# Patient Record
Sex: FEMALE | Race: ASIAN | Employment: UNEMPLOYED | URBAN - METROPOLITAN AREA
[De-identification: names, ages, dates, MRNs, and addresses within clinical notes are randomized per-mention and may not be internally consistent; named-entity substitution may affect disease eponyms.]

---

## 2019-09-18 ENCOUNTER — ANCILLARY PROCEDURE (OUTPATIENT)
Dept: GENERAL RADIOLOGY | Facility: CLINIC | Age: 19
End: 2019-09-18
Attending: FAMILY MEDICINE
Payer: COMMERCIAL

## 2019-09-18 ENCOUNTER — OFFICE VISIT (OUTPATIENT)
Dept: FAMILY MEDICINE | Facility: CLINIC | Age: 19
End: 2019-09-18
Payer: COMMERCIAL

## 2019-09-18 VITALS
BODY MASS INDEX: 24.8 KG/M2 | WEIGHT: 140 LBS | HEIGHT: 63 IN | TEMPERATURE: 97.7 F | OXYGEN SATURATION: 98 % | SYSTOLIC BLOOD PRESSURE: 95 MMHG | HEART RATE: 79 BPM | DIASTOLIC BLOOD PRESSURE: 46 MMHG

## 2019-09-18 DIAGNOSIS — M25.572 PAIN IN JOINT INVOLVING ANKLE AND FOOT, LEFT: ICD-10-CM

## 2019-09-18 DIAGNOSIS — S92.352A CLOSED FRACTURE OF FIFTH METATARSAL BONE OF LEFT FOOT, PHYSEAL INVOLVEMENT UNSPECIFIED, INITIAL ENCOUNTER: Primary | ICD-10-CM

## 2019-09-18 PROCEDURE — 73610 X-RAY EXAM OF ANKLE: CPT | Mod: LT

## 2019-09-18 PROCEDURE — 99203 OFFICE O/P NEW LOW 30 MIN: CPT | Performed by: FAMILY MEDICINE

## 2019-09-18 PROCEDURE — 73630 X-RAY EXAM OF FOOT: CPT | Mod: LT

## 2019-09-18 RX ORDER — BREAST PUMP
EACH MISCELLANEOUS
COMMUNITY
Start: 2019-06-22

## 2019-09-18 RX ORDER — IBUPROFEN 600 MG/1
600 TABLET, FILM COATED ORAL
COMMUNITY
Start: 2019-06-22

## 2019-09-18 RX ORDER — PYRIDOXINE HCL (VITAMIN B6) 25 MG
TABLET ORAL
COMMUNITY
Start: 2018-10-26

## 2019-09-18 SDOH — HEALTH STABILITY: MENTAL HEALTH: HOW OFTEN DO YOU HAVE A DRINK CONTAINING ALCOHOL?: NEVER

## 2019-09-18 ASSESSMENT — PAIN SCALES - GENERAL: PAINLEVEL: WORST PAIN (10)

## 2019-09-18 ASSESSMENT — MIFFLIN-ST. JEOR: SCORE: 1375.29

## 2019-09-18 NOTE — PROGRESS NOTES
Subjective     Shivani Wolfe is a 19 year old female who presents to clinic today for the following health issues:    HPI   Joint Pain  SUBJECTIVE:  Shivani Wolfe is a 19 year old female who sustained a left foot injury 4 days ago. Mechanism of injury: fell on stair, twist foot and ankle, left. Immediate symptoms: immediate pain. Symptoms have been sudden since that time. Prior history of related problems: no prior problems with this area in the past.    OBJECTIVE:  Vital signs as noted above.  Appearance: in no apparent distress.  Foot/ankle exam: Left foot/ ankle exam :  Limited , 2nd to pain and swelling  soft tissue swelling and tenderness over the lateral malleolus, soft tissue swelling and tenderness over the base of 5th metatarsal, peripheral pulses normal.  X-ray: fracture of :  Recent Results (from the past 24 hour(s))   XR Ankle Left G/E 3 Views    Narrative    LEFT ANKLE THREE OR MORE VIEWS   9/18/2019 2:47 PM     HISTORY: Pain in joint involving ankle and foot, left.    Comparison: None.    FINDINGS: Lateral soft tissue swelling. No fracture about the ankle is  evident and there is no talar dome osteochondral lesion. However,  there is a mildly displaced oblique fracture in the mid to distal  shaft of the fifth metatarsal.      Impression    IMPRESSION: Soft tissue swelling and fifth metatarsal fracture.    URI JACKSON MD   XR Foot Left G/E 3 Views    Narrative    FOOT LEFT THREE OR MORE VIEWS   9/18/2019 2:48 PM     HISTORY:  Pain in joint involving ankle and foot, left    COMPARISON: None.    FINDINGS: Lateral soft tissue swelling. Mildly displaced oblique  fracture in the distal shaft of the fifth metatarsal. No angulation.  No articular surface involvement. Otherwise negative.      Impression    IMPRESSION: Mildly displaced oblique fracture in the distal shaft of  the fifth metatarsal.    URI JACKSON MD   .    ASSESSMENT:    ICD-10-CM    1. Closed fracture of fifth metatarsal bone of left foot,  physeal involvement unspecified, initial encounter S92.352A ORTHOPEDICS ADULT REFERRAL     order for DME   2. Pain in joint involving ankle and foot, left M25.572 XR Ankle Left G/E 3 Views     XR Foot Left G/E 3 Views     ORTHOPEDICS ADULT REFERRAL       PLAN:  NSAID, ice suggested  activity modification  Was fitted with a cam walker.  She does have crutches and she has been using.    She was advised with nonweightbearing.  She was referred to orthopedic  See orders in [facility].    Onset: Sunday    Description:   Location: left ankle and foot  Character: Sharp, Stabbing and Burning    Intensity: severe, 10/10    Progression of Symptoms: better, intermitten    Accompanying Signs & Symptoms:  Other symptoms: numbness and swelling    History:   Previous similar pain: YES- same ankle      Precipitating factors:   Trauma or overuse: YES- fall from stairs    Alleviating factors:  Improved by: rest/inactivity, ice and Ibuprofen    Therapies Tried and outcome: Naproxen, helped with the pain    Ольга Ramirez MD.

## 2019-09-19 ENCOUNTER — TELEPHONE (OUTPATIENT)
Dept: FAMILY MEDICINE | Facility: CLINIC | Age: 19
End: 2019-09-19

## 2019-09-19 NOTE — TELEPHONE ENCOUNTER
----- Message from Ольга Ramirez MD sent at 9/18/2019  4:01 PM CDT -----  Please inform pt. Of no other fracture, except for the left fifth metatarsal  Continue with Camwalker, elevation , ICE, no weight bearing  Follow up with Orthro  thanks

## 2019-09-19 NOTE — TELEPHONE ENCOUNTER
Attempt # 1  Called [phone] (home)     Did patient answer the phone: No, left a message on voicemail to return call to triage line at [phone].    Claudia BrooksRN,BSN  [facility]

## 2019-09-19 NOTE — TELEPHONE ENCOUNTER
Patient returned call, I advised her of Dr. Ramirez's advice, she has number for ortho and states she will call to schedule.  Lisy Nascimento RN  [facility]

## 2019-09-26 ENCOUNTER — OFFICE VISIT (OUTPATIENT)
Dept: PODIATRY | Facility: CLINIC | Age: 19
End: 2019-09-26
Payer: COMMERCIAL

## 2019-09-26 VITALS — SYSTOLIC BLOOD PRESSURE: 122 MMHG | HEART RATE: 65 BPM | DIASTOLIC BLOOD PRESSURE: 64 MMHG

## 2019-09-26 DIAGNOSIS — S93.402A SPRAIN OF LEFT ANKLE, UNSPECIFIED LIGAMENT, INITIAL ENCOUNTER: ICD-10-CM

## 2019-09-26 DIAGNOSIS — S92.355A CLOSED NONDISPLACED FRACTURE OF FIFTH METATARSAL BONE OF LEFT FOOT, INITIAL ENCOUNTER: Primary | ICD-10-CM

## 2019-09-26 PROBLEM — Z34.00 SUPERVISION OF NORMAL FIRST PREGNANCY, ANTEPARTUM: Status: ACTIVE | Noted: 2018-12-14

## 2019-09-26 PROBLEM — O36.5930 SMALL FOR GESTATIONAL AGE FETUS AFFECTING MANAGEMENT OF MOTHER, THIRD TRIMESTER, NOT APPLICABLE OR UNSPECIFIED FETUS: Status: ACTIVE | Noted: 2019-06-21

## 2019-09-26 PROCEDURE — 28470 CLTX METATARSAL FX WO MNP EA: CPT | Performed by: PODIATRIST

## 2019-09-26 PROCEDURE — 99203 OFFICE O/P NEW LOW 30 MIN: CPT | Mod: 57 | Performed by: PODIATRIST

## 2019-09-26 NOTE — PATIENT INSTRUCTIONS
Weight management plan: Patient was referred to their PCP to discuss a diet and exercise plan.  We wish you continued good healing. If you have any questions or concerns, please do not hesitate to contact us at [phone]    Please remember to call and schedule a follow up appointment if one was recommended at your earliest convenience.   PODIATRY CLINIC HOURS  TELEPHONE NUMBER    Dr. Myke Burden D.P.M [facility]    Clinics:  [facility]    [facility]   Tuesday 1PM-6PM  [place]  Wednesday 7AM-2PM  [facility]  Thursday 10AM-6PM  [place]  Friday 7AM-3PM  [place]  Specialty schedulers:   [phone] to make an appointment with any Specialty Provider.        Urgent Care locations:    [facility] Monday-Friday 5 pm - 9 pm. Saturday-Sunday 9 am -5pm    Monday-Friday 11 am - 9 pm Saturday 9 am - 5 pm     Monday-Sunday 12 noon-8PM [phone]     If you need a medication refill, please contact us you may need lab work and/or a follow up visit prior to your refill (i.e. Antifungal medications).    [employer] (secure e-mail communication and access to your chart) to send a message or to make an appointment.    If MRI needed please call [place] [facility] at [phone]

## 2019-09-26 NOTE — PROGRESS NOTES
Subjective:    Pt is seen today in consult from Dr. Ramirez for 5th met fx left foot.  Was caused by fall on sister with an inversion sprain on 9/14/2019.  She had immediate pain swelling and ecchymosis.  It was hard for her to bear weight on this.  She presented the clinic on 9/18/2019.  She was diagnosed with 1/5 metatarsal fracture.  She was given an ankle high Aircast.  She says this is comfortable.  She has crutches.  She also complains of pain in her lateral ankle.  She points mostly to the area of the calcaneal cuboid joint.  She has been wearing some compression on her foot at night but not during the day.  She has been trying to elevate this.  She denies any blistering or drainage anywhere.  She denies any calf pain or shortness of breath at all.      ROS:  A 10-point review of systems was performed and is positive for that noted in the HPI and as seen above.  All other areas are negative.          Allergies   Allergen Reactions     Wasp Venom Protein Hives and Rash     Delayed reaction to wasp  Rash over entire body         Current Outpatient Medications   Medication Sig Dispense Refill     ibuprofen (ADVIL/MOTRIN) 600 MG tablet Take 600 mg by mouth       Misc. Devices (PUMP IN STYLE ADVANCED) MISC Electric breast pump for home use. Gestation age at delivery: 39.0 weeks. Reason for need: lactating mother. Length of need: 12 months       order for DME Equipment being ordered: Knee walker    SERINA 6 weeks 1 Device 0     order for DME Equipment being ordered: short Camp walker  Left 5th metatarsal fracture. 1 Units 0     Prenatal Multivit-Min-Fe-FA (PRENATAL VITAMINS) 0.8 MG TABS Take 1 tablet by mouth       PseudoePHEDrine-naproxen Na 120-220 MG TB12        vitamin B6 (PYRIDOXINE) 25 MG tablet Take 1 tablet by mouth every other day.         Patient Active Problem List   Diagnosis     Normal delivery     Small for gestational age fetus affecting management of mother, third trimester, not applicable or  unspecified fetus     Supervision of normal first pregnancy, antepartum       No past medical history on file.    No past surgical history on file.    No family history on file.    Social History     Tobacco Use     Smoking status: Never Smoker     Smokeless tobacco: Never Used   Substance Use Topics     Alcohol use: Never     Frequency: Never         Exam:    Vitals: /64 (BP Location: Right arm)   Pulse 65   BMI: There is no height or weight on file to calculate BMI.  Height: Data Unavailable    Constitutional/ general:  Pt is in no apparent distress, appears well-nourished.  Cooperative with history and physical exam.  Patient using crutches    Psych:  The patient answered questions appropriately.  Normal affect.  Seems to have reasonable expectations, in terms of treatment.     Eyes:  Visual scanning/ tracking without deficit.     Ears:  Response to auditory stimuli is normal.  negative hearing aid devices.  Auricles in proper alignment.     Lymphatic:  Popliteal lymph nodes not enlarged.     Lungs:  Non labored breathing, non labored speech. No cough.  No audible wheezing. Even, quiet breathing.       Vascular:  positive pedal pulses bilaterally for both the DP and PT arteries.  CFT < 3 sec.  negative ankle edema.  positive pedal hair growth.    Neuro:  Alert and oriented x 3. Coordinated gait.  Light touch sensation is intact to the L4, L5, S1 distributions. No obvious deficits.  No evidence of neurological-based weakness, spasticity, or contracture in the lower extremities.     Derm: Normal texture and turgor.  No erythema, ecchymosis, or cyanosis.      Musculoskeletal:    Lower extremity muscle strength is normal.  All muscle compartments left foot appear to be intact.  Patient is using crutches with ankle high Aircast on her left foot.  No gross deformities.  Normal arch.  Pain upon palpation  of left 5th metatarsal neck.  Edema and echymosis noted left lateral foot and ankle.  No erythema.  No sign  of ulceration, infection, or drainage.  No pain over the fibula.  There is some lateral ankle pain.  The pain most noticeable lateral is at her calcaneal cuboid joint.  Minimal pain at the tarsometatarsal joint anywhere.  No medial ankle pain.  No Achilles pain.  No styloid process pain.    Radiographic Exam:  X-Ray Findings:  I personally reviewed the films.  Oblique nondisplaced fracture at the neck of the left fifth metatarsal.  There is also an avulsion fracture at the calcaneal cuboid joint only seen on the AP x-ray    Assessment:  5th met fracture left foot                         Left lateral ankle sprain with calcaneal cuboid joint avulsion fracture    Plan:  X-rays personally reviewed.  Explained to patient that she has fracture of her fifth metatarsal neck that is nondisplaced.  In addition to this she also has an avulsion fracture cuboid joint.  We pointed this out in the x-ray.  We discussed the process of bone healing.  Discussed etiology and treatment options with the patient in detail.  Explained the importance of nonweightbearing at all times.  Discussed that if she walks on this in the fifth metatarsal head moves he could cause ambulatory problems in the future.  She states the Aircast is comfortable.  She will use this but we told her this is only to protect her foot.  She will continue the crutches.  We also wrote her out a prescription for a knee walker.  We gave her instructions on wearing compression on this and elevating this as much as possible.  She will continue icing this.  We will have her return the clinic in 2 weeks for repeat x-ray.  Fracture care.  Thank you for allowing me participate in the care of this patient.        Myke Burden, ELISA PÉREZ, FACFAS

## 2019-09-26 NOTE — LETTER
9/26/2019         RE: Shivani Wolfe  [address]  [place] [address]        Dear Colleague,    Thank you for referring your patient, Shivani Wolfe, to the [facility]. Please see a copy of my visit note below.    Subjective:    Pt is seen today in consult from Dr. Ramirez for 5th met fx left foot.  Was caused by fall on sister with an inversion sprain on 9/14/2019.  She had immediate pain swelling and ecchymosis.  It was hard for her to bear weight on this.  She presented the clinic on 9/18/2019.  She was diagnosed with 1/5 metatarsal fracture.  She was given an ankle high Aircast.  She says this is comfortable.  She has crutches.  She also complains of pain in her lateral ankle.  She points mostly to the area of the calcaneal cuboid joint.  She has been wearing some compression on her foot at night but not during the day.  She has been trying to elevate this.  She denies any blistering or drainage anywhere.  She denies any calf pain or shortness of breath at all.      ROS:  A 10-point review of systems was performed and is positive for that noted in the HPI and as seen above.  All other areas are negative.          Allergies   Allergen Reactions     Wasp Venom Protein Hives and Rash     Delayed reaction to wasp  Rash over entire body         Current Outpatient Medications   Medication Sig Dispense Refill     ibuprofen (ADVIL/MOTRIN) 600 MG tablet Take 600 mg by mouth       Misc. Devices (PUMP IN STYLE ADVANCED) MISC Electric breast pump for home use. Gestation age at delivery: 39.0 weeks. Reason for need: lactating mother. Length of need: 12 months       order for DME Equipment being ordered: Knee walker    SERINA 6 weeks 1 Device 0     order for DME Equipment being ordered: short Camp walker  Left 5th metatarsal fracture. 1 Units 0     Prenatal Multivit-Min-Fe-FA (PRENATAL VITAMINS) 0.8 MG TABS Take 1 tablet by mouth       PseudoePHEDrine-naproxen Na 120-220 MG TB12        vitamin B6 (PYRIDOXINE)  25 MG tablet Take 1 tablet by mouth every other day.         Patient Active Problem List   Diagnosis     Normal delivery     Small for gestational age fetus affecting management of mother, third trimester, not applicable or unspecified fetus     Supervision of normal first pregnancy, antepartum       No past medical history on file.    No past surgical history on file.    No family history on file.    Social History     Tobacco Use     Smoking status: Never Smoker     Smokeless tobacco: Never Used   Substance Use Topics     Alcohol use: Never     Frequency: Never         Exam:    Vitals: /64 (BP Location: Right arm)   Pulse 65   BMI: There is no height or weight on file to calculate BMI.  Height: Data Unavailable    Constitutional/ general:  Pt is in no apparent distress, appears well-nourished.  Cooperative with history and physical exam.  Patient using crutches    Psych:  The patient answered questions appropriately.  Normal affect.  Seems to have reasonable expectations, in terms of treatment.     Eyes:  Visual scanning/ tracking without deficit.     Ears:  Response to auditory stimuli is normal.  negative hearing aid devices.  Auricles in proper alignment.     Lymphatic:  Popliteal lymph nodes not enlarged.     Lungs:  Non labored breathing, non labored speech. No cough.  No audible wheezing. Even, quiet breathing.       Vascular:  positive pedal pulses bilaterally for both the DP and PT arteries.  CFT < 3 sec.  negative ankle edema.  positive pedal hair growth.    Neuro:  Alert and oriented x 3. Coordinated gait.  Light touch sensation is intact to the L4, L5, S1 distributions. No obvious deficits.  No evidence of neurological-based weakness, spasticity, or contracture in the lower extremities.     Derm: Normal texture and turgor.  No erythema, ecchymosis, or cyanosis.      Musculoskeletal:    Lower extremity muscle strength is normal.  All muscle compartments left foot appear to be intact.  Patient is  using crutches with ankle high Aircast on her left foot.  No gross deformities.  Normal arch.  Pain upon palpation  of left 5th metatarsal neck.  Edema and echymosis noted left lateral foot and ankle.  No erythema.  No sign of ulceration, infection, or drainage.  No pain over the fibula.  There is some lateral ankle pain.  The pain most noticeable lateral is at her calcaneal cuboid joint.  Minimal pain at the tarsometatarsal joint anywhere.  No medial ankle pain.  No Achilles pain.  No styloid process pain.    Radiographic Exam:  X-Ray Findings:  I personally reviewed the films.  Oblique nondisplaced fracture at the neck of the left fifth metatarsal.  There is also an avulsion fracture at the calcaneal cuboid joint only seen on the [facility] x-ray    Assessment:  5th met fracture left foot                         Left lateral ankle sprain with calcaneal cuboid joint avulsion fracture    Plan:  X-rays personally reviewed.  Explained to patient that she has fracture of her fifth metatarsal neck that is nondisplaced.  In addition to this she also has an avulsion fracture cuboid joint.  We pointed this out in the x-ray.  We discussed the process of bone healing.  Discussed etiology and treatment options with the patient in detail.  Explained the importance of nonweightbearing at all times.  Discussed that if she walks on this in the fifth metatarsal head moves he could cause ambulatory problems in the future.  She states the Aircast is comfortable.  She will use this but we told her this is only to protect her foot.  She will continue the crutches.  We also wrote her out a prescription for a knee walker.  We gave her instructions on wearing compression on this and elevating this as much as possible.  She will continue icing this.  We will have her return the clinic in 2 weeks for repeat x-ray.  Fracture care.  Thank you for allowing me participate in the care of this patient.        Myke Burden, ELISA DPM,  [facility]      Again, thank you for allowing me to participate in the care of your patient.        Sincerely,        Myke Burden DPM

## 2019-11-07 ENCOUNTER — ANCILLARY PROCEDURE (OUTPATIENT)
Dept: GENERAL RADIOLOGY | Facility: CLINIC | Age: 19
End: 2019-11-07
Attending: PODIATRIST
Payer: COMMERCIAL

## 2019-11-07 ENCOUNTER — OFFICE VISIT (OUTPATIENT)
Dept: PODIATRY | Facility: CLINIC | Age: 19
End: 2019-11-07
Payer: COMMERCIAL

## 2019-11-07 VITALS
SYSTOLIC BLOOD PRESSURE: 128 MMHG | BODY MASS INDEX: 24.85 KG/M2 | HEART RATE: 84 BPM | DIASTOLIC BLOOD PRESSURE: 70 MMHG | WEIGHT: 142 LBS

## 2019-11-07 DIAGNOSIS — S92.355A CLOSED NONDISPLACED FRACTURE OF FIFTH METATARSAL BONE OF LEFT FOOT, INITIAL ENCOUNTER: Primary | ICD-10-CM

## 2019-11-07 DIAGNOSIS — S93.402A SPRAIN OF LEFT ANKLE, UNSPECIFIED LIGAMENT, INITIAL ENCOUNTER: ICD-10-CM

## 2019-11-07 DIAGNOSIS — S92.355A CLOSED NONDISPLACED FRACTURE OF FIFTH METATARSAL BONE OF LEFT FOOT, INITIAL ENCOUNTER: ICD-10-CM

## 2019-11-07 PROCEDURE — 73630 X-RAY EXAM OF FOOT: CPT | Mod: LT

## 2019-11-07 PROCEDURE — 99207 ZZC FRACTURE CARE IN GLOBAL PERIOD: CPT | Performed by: PODIATRIST

## 2019-11-07 PROCEDURE — 99213 OFFICE O/P EST LOW 20 MIN: CPT | Mod: 24 | Performed by: PODIATRIST

## 2019-11-07 NOTE — LETTER
11/7/2019         RE: Shivani Wolfe  [address]  [place] [address]        Dear Colleague,    Thank you for referring your patient, Shivani Wolfe, to the [facility]. Please see a copy of my visit note below.    Subjective:    9/26/19   Pt is seen today in consult from Dr. Ramirez for 5th met fx left foot.  Was caused by fall on sister with an inversion sprain on 9/14/2019.  She had immediate pain swelling and ecchymosis.  It was hard for her to bear weight on this.  She presented the clinic on 9/18/2019.  She was diagnosed with 5th metatarsal fracture.  She was given an ankle high Aircast.  She says this is comfortable.  She has crutches.  She also complains of pain in her lateral ankle.  She points mostly to the area of the calcaneal cuboid joint.  She has been wearing some compression on her foot at night but not during the day.  She has been trying to elevate this.  She denies any blistering or drainage anywhere.  She denies any calf pain or shortness of breath at all.    11/7/19 patient returns for evaluation of her left foot.  She still walking in the ankle high Aircast.  She has no pain at all.  She is noted no blistering erythema or ecchymosis.  She states she still gets some swelling around her ankle.  She denies any calf pain or shortness of breath.  It sounds as though she is not having any pain in her rear foot.         ROS: See above         Allergies   Allergen Reactions     Wasp Venom Protein Hives and Rash     Delayed reaction to wasp  Rash over entire body         Current Outpatient Medications   Medication Sig Dispense Refill     ibuprofen (ADVIL/MOTRIN) 600 MG tablet Take 600 mg by mouth       [facility]. Devices (PUMP IN STYLE ADVANCED) MISC Electric breast pump for home use. Gestation age at delivery: 39.0 weeks. Reason for need: lactating mother. Length of need: 12 months       order for DME Equipment being ordered: Knee walker    SERINA 6 weeks 1 Device 0     order for DME  Equipment being ordered: short Camp walker  Left 5th metatarsal fracture. 1 Units 0     Prenatal Multivit-Min-Fe-FA (PRENATAL VITAMINS) 0.8 MG TABS Take 1 tablet by mouth       PseudoePHEDrine-naproxen Na 120-220 MG TB12        vitamin B6 (PYRIDOXINE) 25 MG tablet Take 1 tablet by mouth every other day.         Patient Active Problem List   Diagnosis     Normal delivery     Small for gestational age fetus affecting management of mother, third trimester, not applicable or unspecified fetus     Supervision of normal first pregnancy, antepartum       No past medical history on file.    No past surgical history on file.    No family history on file.    Social History     Tobacco Use     Smoking status: Never Smoker     Smokeless tobacco: Never Used   Substance Use Topics     Alcohol use: Never     Frequency: Never         Exam:    Vitals: There were no vitals taken for this visit.  BMI: There is no height or weight on file to calculate BMI.  Height: Data Unavailable    Constitutional/ general:  Pt is in no apparent distress, appears well-nourished.  Cooperative with history and physical exam.      Psych:  The patient answered questions appropriately.  Normal affect.  Seems to have reasonable expectations, in terms of treatment.     Eyes:  Visual scanning/ tracking without deficit.     Ears:  Response to auditory stimuli is normal.  negative hearing aid devices.  Auricles in proper alignment.     Lymphatic:  Popliteal lymph nodes not enlarged.     Lungs:  Non labored breathing, non labored speech. No cough.  No audible wheezing. Even, quiet breathing.       Vascular:  positive pedal pulses bilaterally for both the DP and PT arteries.  CFT < 3 sec.  negative ankle edema.  positive pedal hair growth.    Neuro:  Alert and oriented x 3. Coordinated gait.  Light touch sensation is intact to the L4, L5, S1 distributions. No obvious deficits.  No evidence of neurological-based weakness, spasticity, or contracture in the lower  extremities.     Derm: Normal texture and turgor.  No erythema, ecchymosis, or cyanosis.      Musculoskeletal:    Lower extremity muscle strength is normal.  All muscle compartments left foot appear to be intact.  Patient is walking in ankle high Aircast today.  There is no pain on palpation of the area of the fifth metatarsal fracture.  No pain with loading this bone.  There is just slight edema laterally.  In the area of the calcaneal cuboid joint there is no pain.  No pain with stressing any lateral tendons.    Radiographic Exam:  X-Ray Findings: Fifth metatarsal fracture has increased obscuration.  There is been no movement it is in a good alignment.  The small avulsion fracture at the calcaneal cuboid joint is still present.    Assessment:  5th met fracture left foot                         Left lateral ankle sprain with calcaneal cuboid joint avulsion fracture    Plan:  X-rays taken today of left foot.  I discussed with the patient that both her fifth metatarsal avulsion fracture healing and she should get out of the boot as more motion and pressure here will help aid in healing and help reduce the edema around her ankle.  She will get a compressive ankle sleeve.   Patient will start tomorrow walking in good supportive shoe.  If there is no pain or edema then will slowly increase time in shoe 1-2 hours per day until walking all day in good shoe.  If patient has pain or edema back in cam walker for 5 days and then will try again.  No running, jogging, or high impact activities until walking all day with no pain for 2 weeks.  Discussed importance of not reinjuring this.  She will call for physical therapy if she is having lateral instability.  Return to clinic prn.          Myke Burden DPM DPM, FACFAS      Again, thank you for allowing me to participate in the care of your patient.        Sincerely,        Myke Burden DPM

## 2019-11-07 NOTE — PATIENT INSTRUCTIONS
Weight management plan: Patient was referred to their PCP to discuss a diet and exercise plan.  We wish you continued good healing. If you have any questions or concerns, please do not hesitate to contact us at [phone]    Please remember to call and schedule a follow up appointment if one was recommended at your earliest convenience.   PODIATRY CLINIC HOURS  TELEPHONE NUMBER    Dr. Myke Burden D.P.M [facility]    Clinics:  [facility]    [facility]   Tuesday 1PM-6PM  [place]  Wednesday 7AM-2PM  [facility]  Thursday 10AM-6PM  [place]  Friday 7AM-3PM  [place]  Specialty schedulers:   [phone] to make an appointment with any Specialty Provider.        Urgent Care locations:    [facility] Monday-Friday 5 pm - 9 pm. Saturday-Sunday 9 am -5pm    Monday-Friday 11 am - 9 pm Saturday 9 am - 5 pm     Monday-Sunday 12 noon-8PM [phone]     If you need a medication refill, please contact us you may need lab work and/or a follow up visit prior to your refill (i.e. Antifungal medications).    [employer] (secure e-mail communication and access to your chart) to send a message or to make an appointment.    If MRI needed please call [place] [facility]oder at [phone]

## 2019-11-07 NOTE — PROGRESS NOTES
Subjective:    9/26/19   Pt is seen today in consult from Dr. Ramirez for 5th met fx left foot.  Was caused by fall on sister with an inversion sprain on 9/14/2019.  She had immediate pain swelling and ecchymosis.  It was hard for her to bear weight on this.  She presented the clinic on 9/18/2019.  She was diagnosed with 5th metatarsal fracture.  She was given an ankle high Aircast.  She says this is comfortable.  She has crutches.  She also complains of pain in her lateral ankle.  She points mostly to the area of the calcaneal cuboid joint.  She has been wearing some compression on her foot at night but not during the day.  She has been trying to elevate this.  She denies any blistering or drainage anywhere.  She denies any calf pain or shortness of breath at all.    11/7/19 patient returns for evaluation of her left foot.  She still walking in the ankle high Aircast.  She has no pain at all.  She is noted no blistering erythema or ecchymosis.  She states she still gets some swelling around her ankle.  She denies any calf pain or shortness of breath.  It sounds as though she is not having any pain in her rear foot.         ROS: See above         Allergies   Allergen Reactions     Wasp Venom Protein Hives and Rash     Delayed reaction to wasp  Rash over entire body         Current Outpatient Medications   Medication Sig Dispense Refill     ibuprofen (ADVIL/MOTRIN) 600 MG tablet Take 600 mg by mouth       Misc. Devices (PUMP IN STYLE ADVANCED) MISC Electric breast pump for home use. Gestation age at delivery: 39.0 weeks. Reason for need: lactating mother. Length of need: 12 months       order for DME Equipment being ordered: Knee walker    SERINA 6 weeks 1 Device 0     order for DME Equipment being ordered: short Camp walker  Left 5th metatarsal fracture. 1 Units 0     Prenatal Multivit-Min-Fe-FA (PRENATAL VITAMINS) 0.8 MG TABS Take 1 tablet by mouth       PseudoePHEDrine-naproxen Na 120-220 MG TB12        vitamin B6  (PYRIDOXINE) 25 MG tablet Take 1 tablet by mouth every other day.         Patient Active Problem List   Diagnosis     Normal delivery     Small for gestational age fetus affecting management of mother, third trimester, not applicable or unspecified fetus     Supervision of normal first pregnancy, antepartum       No past medical history on file.    No past surgical history on file.    No family history on file.    Social History     Tobacco Use     Smoking status: Never Smoker     Smokeless tobacco: Never Used   Substance Use Topics     Alcohol use: Never     Frequency: Never         Exam:    Vitals: There were no vitals taken for this visit.  BMI: There is no height or weight on file to calculate BMI.  Height: Data Unavailable    Constitutional/ general:  Pt is in no apparent distress, appears well-nourished.  Cooperative with history and physical exam.      Psych:  The patient answered questions appropriately.  Normal affect.  Seems to have reasonable expectations, in terms of treatment.     Eyes:  Visual scanning/ tracking without deficit.     Ears:  Response to auditory stimuli is normal.  negative hearing aid devices.  Auricles in proper alignment.     Lymphatic:  Popliteal lymph nodes not enlarged.     Lungs:  Non labored breathing, non labored speech. No cough.  No audible wheezing. Even, quiet breathing.       Vascular:  positive pedal pulses bilaterally for both the DP and PT arteries.  CFT < 3 sec.  negative ankle edema.  positive pedal hair growth.    Neuro:  Alert and oriented x 3. Coordinated gait.  Light touch sensation is intact to the L4, L5, S1 distributions. No obvious deficits.  No evidence of neurological-based weakness, spasticity, or contracture in the lower extremities.     Derm: Normal texture and turgor.  No erythema, ecchymosis, or cyanosis.      Musculoskeletal:    Lower extremity muscle strength is normal.  All muscle compartments left foot appear to be intact.  Patient is walking in  ankle high Aircast today.  There is no pain on palpation of the area of the fifth metatarsal fracture.  No pain with loading this bone.  There is just slight edema laterally.  In the area of the calcaneal cuboid joint there is no pain.  No pain with stressing any lateral tendons.    Radiographic Exam:  X-Ray Findings: Fifth metatarsal fracture has increased obscuration.  There is been no movement it is in a good alignment.  The small avulsion fracture at the calcaneal cuboid joint is still present.    Assessment:  5th met fracture left foot                         Left lateral ankle sprain with calcaneal cuboid joint avulsion fracture    Plan:  X-rays taken today of left foot.  I discussed with the patient that both her fifth metatarsal avulsion fracture healing and she should get out of the boot as more motion and pressure here will help aid in healing and help reduce the edema around her ankle.  She will get a compressive ankle sleeve.   Patient will start tomorrow walking in good supportive shoe.  If there is no pain or edema then will slowly increase time in shoe 1-2 hours per day until walking all day in good shoe.  If patient has pain or edema back in cam walker for 5 days and then will try again.  No running, jogging, or high impact activities until walking all day with no pain for 2 weeks.  Discussed importance of not reinjuring this.  She will call for physical therapy if she is having lateral instability.  Return to clinic prn.          Myke Burden DPM DPM, FACFAS

## 2020-03-11 ENCOUNTER — HEALTH MAINTENANCE LETTER (OUTPATIENT)
Age: 20
End: 2020-03-11

## 2021-01-03 ENCOUNTER — HEALTH MAINTENANCE LETTER (OUTPATIENT)
Age: 21
End: 2021-01-03

## 2021-04-25 ENCOUNTER — HEALTH MAINTENANCE LETTER (OUTPATIENT)
Age: 21
End: 2021-04-25

## 2021-10-10 ENCOUNTER — HEALTH MAINTENANCE LETTER (OUTPATIENT)
Age: 21
End: 2021-10-10

## 2022-05-22 ENCOUNTER — HEALTH MAINTENANCE LETTER (OUTPATIENT)
Age: 22
End: 2022-05-22

## 2022-09-18 ENCOUNTER — HEALTH MAINTENANCE LETTER (OUTPATIENT)
Age: 22
End: 2022-09-18

## 2023-06-04 ENCOUNTER — HEALTH MAINTENANCE LETTER (OUTPATIENT)
Age: 23
End: 2023-06-04